# Patient Record
Sex: FEMALE | Race: BLACK OR AFRICAN AMERICAN | HISPANIC OR LATINO | URBAN - METROPOLITAN AREA
[De-identification: names, ages, dates, MRNs, and addresses within clinical notes are randomized per-mention and may not be internally consistent; named-entity substitution may affect disease eponyms.]

---

## 2023-04-03 ENCOUNTER — LAB OUTSIDE AN ENCOUNTER (OUTPATIENT)
Dept: URBAN - METROPOLITAN AREA CLINIC 63 | Facility: CLINIC | Age: 58
End: 2023-04-03

## 2023-04-03 ENCOUNTER — DASHBOARD ENCOUNTERS (OUTPATIENT)
Age: 58
End: 2023-04-03

## 2023-04-03 ENCOUNTER — OFFICE VISIT (OUTPATIENT)
Dept: URBAN - METROPOLITAN AREA CLINIC 63 | Facility: CLINIC | Age: 58
End: 2023-04-03
Payer: MEDICARE

## 2023-04-03 ENCOUNTER — WEB ENCOUNTER (OUTPATIENT)
Dept: URBAN - METROPOLITAN AREA CLINIC 63 | Facility: CLINIC | Age: 58
End: 2023-04-03

## 2023-04-03 VITALS
SYSTOLIC BLOOD PRESSURE: 138 MMHG | TEMPERATURE: 98.1 F | HEIGHT: 61 IN | WEIGHT: 109 LBS | OXYGEN SATURATION: 97 % | BODY MASS INDEX: 20.58 KG/M2 | DIASTOLIC BLOOD PRESSURE: 74 MMHG | HEART RATE: 68 BPM

## 2023-04-03 DIAGNOSIS — R79.89 ELEVATED LFTS: ICD-10-CM

## 2023-04-03 DIAGNOSIS — R19.7 DIARRHEA, UNSPECIFIED TYPE: ICD-10-CM

## 2023-04-03 DIAGNOSIS — E83.19 IRON OVERLOAD: ICD-10-CM

## 2023-04-03 DIAGNOSIS — R10.12 LEFT UPPER QUADRANT ABDOMINAL PAIN: ICD-10-CM

## 2023-04-03 DIAGNOSIS — R11.2 NAUSEA AND VOMITING, UNSPECIFIED VOMITING TYPE: ICD-10-CM

## 2023-04-03 PROBLEM — 60737008: Status: ACTIVE | Noted: 2023-04-03

## 2023-04-03 PROCEDURE — 99204 OFFICE O/P NEW MOD 45 MIN: CPT | Performed by: PHYSICIAN ASSISTANT

## 2023-04-03 RX ORDER — LIDOCAINE AND PRILOCAINE 25; 25 MG/G; MG/G
APPLY ON ABCESS EVERY TREATMENT CREAM TOPICAL
Qty: 60 GRAM | Refills: 0 | Status: ACTIVE | COMMUNITY

## 2023-04-03 RX ORDER — EZETIMIBE 10 MG/1
TAKE 1 TABLET BY MOUTH EVERY DAY TABLET ORAL
Qty: 90 EACH | Refills: 1 | Status: ACTIVE | COMMUNITY

## 2023-04-03 RX ORDER — FAMOTIDINE 20 MG/1
TAKE 1 TABLET (20 MG) BY ORAL ROUTE DAILY TABLET, FILM COATED ORAL
Qty: 90 EACH | Refills: 0 | Status: ACTIVE | COMMUNITY

## 2023-04-03 RX ORDER — ALPRAZOLAM 1 MG/1
TAKE 1 TABLET BY MOUTH EVERY DAY AS NEEDED TABLET ORAL
Qty: 30 EACH | Refills: 2 | Status: ACTIVE | COMMUNITY

## 2023-04-03 RX ORDER — AMLODIPINE BESYLATE 10 MG/1
TABLET ORAL
Qty: 90 TABLET | Status: ACTIVE | COMMUNITY

## 2023-04-03 RX ORDER — ATORVASTATIN CALCIUM 80 MG/1
TAKE 1 TABLET BY MOUTH EVERY DAY TABLET, FILM COATED ORAL
Qty: 90 EACH | Refills: 2 | Status: ACTIVE | COMMUNITY

## 2023-04-03 RX ORDER — PANTOPRAZOLE SODIUM 40 MG/1
TABLET, DELAYED RELEASE ORAL
Qty: 60 TABLET | Status: ACTIVE | COMMUNITY

## 2023-04-03 RX ORDER — METOCLOPRAMIDE 10 MG/1
TABLET ORAL
Qty: 21 TABLET | COMMUNITY

## 2023-04-03 RX ORDER — SEVELAMER CARBONATE 800 MG/1
TABLET, FILM COATED ORAL
Qty: 900 TABLET | Status: ACTIVE | COMMUNITY

## 2023-04-03 RX ORDER — INSULIN ASPART 100 [IU]/ML
INJECTABLE FOR SLIDING SCALE USE. MAX DOSE 60 UNITS A DAY INJECTION, SOLUTION INTRAVENOUS; SUBCUTANEOUS
Qty: 45 MILLILITER | Refills: 5 | Status: ACTIVE | COMMUNITY

## 2023-04-03 RX ORDER — CLOPIDOGREL BISULFATE 75 MG/1
TAKE 1 TABLET BY MOUTH EVERY DAY TABLET, FILM COATED ORAL
Qty: 90 EACH | Refills: 2 | Status: ACTIVE | COMMUNITY

## 2023-04-03 RX ORDER — ONDANSETRON HYDROCHLORIDE 4 MG/1
TABLET, FILM COATED ORAL
Qty: 20 TABLET | Status: ACTIVE | COMMUNITY

## 2023-04-03 RX ORDER — CARVEDILOL 25 MG/1
TAKE 1 TABLET BY MOUTH TWICE A DAY TABLET, FILM COATED ORAL
Qty: 180 EACH | Refills: 1 | Status: ACTIVE | COMMUNITY

## 2023-04-03 NOTE — PHYSICAL EXAM GASTROINTESTINAL
Abdomen , soft, nontender, nondistended , no guarding or rigidity , no masses palpable , normal bowel sounds , Liver and Spleen , no hepatomegaly present , no hepatosplenomegaly , liver nontender , spleen not palpable +++bruit. Mass in RLQ-likely transplanted kidney.

## 2023-04-03 NOTE — PHYSICAL EXAM CONSTITUTIONAL:
Thin AA woman, appears ill but in no acute distress , ambulating without difficulty , normal communication ability

## 2023-04-03 NOTE — HPI-TODAY'S VISIT:
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 She was admitted to [facility] with complaints of intermittent abdominal pain, vomiting, and diarrhea which had been going on for about 2 months.  She reported an associated 30 pound weight loss.  She also reported cough and shortness of breath over the preceding several days.  She denied any hematemesis, hematochezia, melena.  Her labs on admission demonstrated WBC 9.4, hemoglobin 10.3 (stable), platelets 184, creatinine 10.36, potassium 6.6, AST 49, ALT 23, alkaline phosphatase 197, total bilirubin 1.2.  Iron studies were done during her hospital stay.  Serum iron was normal at 116, iron saturation 91%, ferritin 16,200. CT scan of the abdomen and pelvis was done without contrast which showed nodular small airspace opacities in the right lower lobe concerning for aspiration.  Minimal ascites with mild pelvic free fluid.  Left colon diverticulosis.  Stable small pericardial effusion. She was seen by [facility] gastroenterology.  She was treated with Protonix.  She underwent EGD on March 20, 2023 which demonstrated probable single column of grade 1 varices in the distal esophagus without stigmata of recent hemorrhage.  Possible portal hypertensive gastropathy.  No biopsy specimens were taken due to Plavix usage.  Her EGD was otherwise unremarkable and she was started on a trial of dicyclomine 10 mg twice daily.  Following her EGD portal duplex was done which demonstrated no evidence of portal venous thrombosis.  She had a small reversal of flow likely related to right heart failure.  The patient's symptoms ultimately improved and she was discharged.  She began having nausea about 2 months ago. This continued for a while and then she began having intermittent LUQ pain. She has pain on most days. Pain will last for hours at a time. She describes the pain as sharp. Pain is not related to eating. Pain improves a bit after she vomits. She vomits every day. She denies hematemesis. She then began to have diarrhea about 2-3 weeks ago. She has diarrhea 2-3 times per day. She has no nocturnal diarrhea. She has not been able to eat any solid food since her symptoms began and has lost about 30 pounds. She feels very full after only eating small amounts. She was in the ER Saturday and had a CT scan. She had the results on her phone. CT showed sigmoid colitis. She was discharged and prescribed reglan to use as needed which she never picked up. 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   Colonoscopy was incomplete 2 years ago due to tortuosity according to the patient. States barium enema was then done which was normal.

## 2023-04-04 ENCOUNTER — TELEPHONE ENCOUNTER (OUTPATIENT)
Dept: URBAN - METROPOLITAN AREA CLINIC 60 | Facility: CLINIC | Age: 58
End: 2023-04-04

## 2023-04-04 ENCOUNTER — TELEPHONE ENCOUNTER (OUTPATIENT)
Dept: URBAN - METROPOLITAN AREA CLINIC 63 | Facility: CLINIC | Age: 58
End: 2023-04-04

## 2023-04-06 ENCOUNTER — TELEPHONE ENCOUNTER (OUTPATIENT)
Dept: URBAN - METROPOLITAN AREA CLINIC 63 | Facility: CLINIC | Age: 58
End: 2023-04-06

## 2023-04-27 ENCOUNTER — TELEPHONE ENCOUNTER (OUTPATIENT)
Dept: URBAN - METROPOLITAN AREA CLINIC 63 | Facility: CLINIC | Age: 58
End: 2023-04-27

## 2023-07-06 ENCOUNTER — APPOINTMENT (RX ONLY)
Dept: URBAN - METROPOLITAN AREA CLINIC 331 | Facility: CLINIC | Age: 58
Setting detail: DERMATOLOGY
End: 2023-07-06

## 2023-07-06 DIAGNOSIS — L20.89 OTHER ATOPIC DERMATITIS: ICD-10-CM

## 2023-07-06 DIAGNOSIS — L21.8 OTHER SEBORRHEIC DERMATITIS: ICD-10-CM

## 2023-07-06 PROCEDURE — ? PRESCRIPTION MEDICATION MANAGEMENT

## 2023-07-06 PROCEDURE — 99204 OFFICE O/P NEW MOD 45 MIN: CPT

## 2023-07-06 PROCEDURE — ? ADDITIONAL NOTES

## 2023-07-06 PROCEDURE — ? COUNSELING

## 2023-07-06 PROCEDURE — ? PRESCRIPTION

## 2023-07-06 RX ORDER — TRIAMCINOLONE ACETONIDE 1 MG/G
CREAM TOPICAL BID
Qty: 453.6 | Refills: 2 | Status: ERX | COMMUNITY
Start: 2023-07-06

## 2023-07-06 RX ORDER — FLUOCINOLONE ACETONIDE 0.11 MG/ML
OIL TOPICAL TIW
Qty: 118.28 | Refills: 3 | Status: ERX | COMMUNITY
Start: 2023-07-06

## 2023-07-06 RX ADMIN — FLUOCINOLONE ACETONIDE: 0.11 OIL TOPICAL at 00:00

## 2023-07-06 RX ADMIN — TRIAMCINOLONE ACETONIDE: 1 CREAM TOPICAL at 00:00

## 2023-07-06 ASSESSMENT — LOCATION DETAILED DESCRIPTION DERM: LOCATION DETAILED: LEFT SUPERIOR UPPER BACK

## 2023-07-06 ASSESSMENT — LOCATION SIMPLE DESCRIPTION DERM: LOCATION SIMPLE: LEFT UPPER BACK

## 2023-07-06 ASSESSMENT — LOCATION ZONE DERM: LOCATION ZONE: TRUNK

## 2023-07-06 NOTE — PROCEDURE: ADDITIONAL NOTES
Additional Notes: If no improvement, plan to biopsy.
Render Risk Assessment In Note?: no
Detail Level: Simple